# Patient Record
(demographics unavailable — no encounter records)

---

## 2025-03-24 NOTE — ASSESSMENT
[FreeTextEntry1] : Recommend urological consultation for assessment of complex recurrent UTI and pelvic collection.  At this time there is no documented evidence of communication to the GI tract.  Will obtain recent CT scans discs for further evaluation.  Appropriate referrals given.

## 2025-03-24 NOTE — PHYSICAL EXAM
[Abdomen Masses] : No abdominal masses [Abdomen Tenderness] : ~T No ~M abdominal tenderness [No HSM] : no hepatosplenomegaly [JVD] : no jugular venous distention  [Normal Breath Sounds] : Normal breath sounds [Normal Heart Sounds] : normal heart sounds [de-identified] : Suprapubic IR drain in place.  Kim catheter in place.

## 2025-03-24 NOTE — HISTORY OF PRESENT ILLNESS
[FreeTextEntry1] : 70 yo F presents for initial evaluation.    PMH: HTN, DM, kidney stones PSH: hysterectomy, "liver mesh" FH: mother- colorectal cancer Medications: ASA, atorvastatin, metformin, valsartan, multivitamin  Allergies: NKDA Social history: Last Colonoscopy:  OBHx: ,  x4  History of endometrial cancer in .  Underwent hysterectomy followed by radiation therapy.  Treated for bladder stones with cystoscopy and lithotripsy.  Since that time has developed multiple pelvic collections requiring drainage and recurrent UTIs.  Discharged from [facility] 1 week prior.  Currently with Kim catheter and interventional radiology drain in place.  On IV antibiotics.  Denies pain at this time.  Appetite stable.  Bowel function normal.

## 2025-04-17 NOTE — HISTORY OF PRESENT ILLNESS
[FreeTextEntry1] : Chief Complaint: UTI bladder infection. Date of first visit: 04/17/2025  JACQUIE ESTRELLA is a 71 year old female who presents for UTI bladder infection.  History of endometrial cancer 2024 and underwent hysterectomy followed by radiation  Foe the past year she has been undergoing trial voids, passes void trial.  Couple of week later she develops right lower abdominal pain and then catheter placed.  Also report she has developed bladder stones Underwent cystoscopy with laser lithotripsy of bladder calculi on 4/17/24  3 weeks ago had drain placed due to abscess  CT Hx:  January 20 Air bubbles and bladder stones up to 3 cm   CT Cystogram 3/2025 There is a fistulous communication between the anterior wall of the urinary bladder and the rectus musculature of the lower abdominal wall.  Extravasated contrast from the bladder accumulates into a poorly defined intramuscular fluid collection 9.7 x 4.6 cm   Medications: ASA, atorvastatin, metformin, valsartan, multivitamin PMH: HTN, DM, kidney stones PSH: hysterectomy, "liver mesh" FH: mother- colorectal cancer

## 2025-04-17 NOTE — ASSESSMENT
[FreeTextEntry1] : DX: suspect bladder erosion/fistula resulting in infected collections; in ddx is recurrent cancer, or bladder cancer cysto today with most of right bladder wall edematous, erythematous, inflammed  Plan Cytology  CX Cysto in OR with cystogram, better assess amount of bladder wall involved and if trigone involved (hard to see today given cloudy urine and patient intolerance/discomfort) and biopsy to r/o malignancy    Jesus Quigley MD, FACS, FRCS  of Urology [facility] Director of Laparoscopic and Robotic Surgery [facility] Director of Urology, [facility] Professor of Urology   (Office) [phone] (Cell)  [phone] [email or web address]     I performed history/review of imaging, discussed treatment plan with patient, agree with above transcription by PERRY.

## 2025-04-17 NOTE — ASSESSMENT
[FreeTextEntry1] : DX: suspect bladder erosion/fistula resulting in infected collections; in ddx is recurrent cancer, or bladder cancer cysto today with most of right bladder wall edematous, erythematous, inflammed  Plan Cytology  CX Cysto in OR with cystogram, better assess amount of bladder wall involved and if trigone involved (hard to see today given cloudy urine and patient intolerance/discomfort) and biopsy to r/o malignancy    Jesus Quigley MD, FACS, FRCS  of Urology [facility] Director of Laparoscopic and Robotic Surgery [facility] Director of Urology, [facility] Professor of Urology   (Office) [phone] (Cell)  [phone] [email or web address]     I performed history/review of imaging, discussed treatment plan with patient, agree with above transcription by PERRY.

## 2025-05-18 NOTE — HISTORY OF PRESENT ILLNESS
[FreeTextEntry1] : 71-year-old female referred by Dr. 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 She has a history of UTIs, incontinence and urinary retention with several marion catheters and TOVs. History of hysterectomy with sling>10 years ago at [facility]. She had a cystoscopy on 4/17/25 which showed right bladder wall edema and erythema. Urine cytology negative. Cystoscopy with cystogram in the OR recommended. Procedure done on 5/7/25. Found to have a vesicovaginal fistula and intravesical erosion of mesh. She is here today with marion catheter in place. Drain placed by IR for pelvic abscess.   CT (3/13/25) Fistulous tract from anterior/inferior wall of the urinary bladder to the lower anterior abdominal wall rectus musculature. Extravasated contrast from the bladder accumulates into a poorly defined intramuscular fluid collection measuring 9.7 x 4.6cm. Bladder diverticulum noted. Urinary bladder diffusely thick-walled.   Pathology (5/7/25) Final Diagnosis 1.  Posterior bladder wall: -   Ulcerated extensively denuded urothelium with acute and chronic inflammation and fibrosis  2.  Eroded mesh from bladder: -   Mesh, gross diagnosis

## 2025-05-18 NOTE — HISTORY OF PRESENT ILLNESS
[FreeTextEntry1] : 71-year-old female referred by Dr. 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 She has a history of UTIs, incontinence and urinary retention with several marion catheters and TOVs. History of hysterectomy with sling>10 years ago at [facility]. She had a cystoscopy on 4/17/25 which showed right bladder wall edema and erythema. Urine cytology negative. Cystoscopy with cystogram in the OR recommended. Procedure done on 5/7/25. Found to have a vesicovaginal fistula and intravesical erosion of mesh. She is here today with maroin catheter in place. Drain placed by IR for pelvic abscess.   CT (3/13/25) Fistulous tract from anterior/inferior wall of the urinary bladder to the lower anterior abdominal wall rectus musculature. Extravasated contrast from the bladder accumulates into a poorly defined intramuscular fluid collection measuring 9.7 x 4.6cm. Bladder diverticulum noted. Urinary bladder diffusely thick-walled.   Pathology (5/7/25) Final Diagnosis 1.  Posterior bladder wall: -   Ulcerated extensively denuded urothelium with acute and chronic inflammation and fibrosis  2.  Eroded mesh from bladder: -   Mesh, gross diagnosis

## 2025-05-18 NOTE — LETTER BODY
[Dear  ___] : Dear  [unfilled], [Consult Letter:] : I had the pleasure of evaluating your patient, [unfilled]. [Please see my note below.] : Please see my note below. [Consult Closing:] : Thank you very much for allowing me to participate in the care of this patient.  If you have any questions, please do not hesitate to contact me. [Sincerely,] : Sincerely, [FreeTextEntry3] : Heron Laguerre MD System Director Urogynecology/URPS Department of Urology [facility]   at [facility] for [facility]  of Urology [facility] at [facility]

## 2025-05-18 NOTE — LETTER BODY
[Dear  ___] : Dear  [unfilled], [Consult Letter:] : I had the pleasure of evaluating your patient, [unfilled]. [Please see my note below.] : Please see my note below. [Consult Closing:] : Thank you very much for allowing me to participate in the care of this patient.  If you have any questions, please do not hesitate to contact me. [Sincerely,] : Sincerely, [FreeTextEntry3] : Heron Laguerre MD System Director Urogynecology/URPS Department of Urology [facility]   at [facility] for [facility]rology  of Urology [facility] at [facility]

## 2025-05-18 NOTE — ASSESSMENT
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   -CT Urogram with cystogram -F/u for cystoscopy -Urine culture today. Will f/u with result.   I, Dr. Heron Laguerre, personally performed the evaluation and management (E/M) services for this established patient who presents today with (a) new problem(s)/exacerbation of (an) existing condition(s).  That E/M includes conducting the clinically appropriate interval history &/or exam, assessing all new/exacerbated conditions, and establishing a new plan of care.  Today, my PERRY Shawnee, was here to observe &/or participate in the visit & follow plan of care established by me.

## 2025-05-18 NOTE — LETTER BODY
[Dear  ___] : Dear  [unfilled], [Consult Letter:] : I had the pleasure of evaluating your patient, [unfilled]. [Please see my note below.] : Please see my note below. [Consult Closing:] : Thank you very much for allowing me to participate in the care of this patient.  If you have any questions, please do not hesitate to contact me. [Sincerely,] : Sincerely, [FreeTextEntry3] : Heron Laguerre MD System Director Urogynecology/URPS Department of Urology [facility]   at [facility]  of Urology [facility] at [facility]

## 2025-05-18 NOTE — HISTORY OF PRESENT ILLNESS
[FreeTextEntry1] : 71-year-old female referred by Dr. 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 She has a history of UTIs, incontinence and urinary retention with several marion catheters and TOVs. History of hysterectomy with sling>10 years ago at [facility]. She had a cystoscopy on 4/17/25 which showed right bladder wall edema and erythema. Urine cytology negative. Cystoscopy with cystogram in the OR recommended. Procedure done on 5/7/25. Found to have a vesicovaginal fistula and intravesical erosion of mesh. She is here today with marion catheter in place. Drain placed by IR for pelvic abscess.   CT (3/13/25) Fistulous tract from anterior/inferior wall of the urinary bladder to the lower anterior abdominal wall rectus musculature. Extravasated contrast from the bladder accumulates into a poorly defined intramuscular fluid collection measuring 9.7 x 4.6cm. Bladder diverticulum noted. Urinary bladder diffusely thick-walled.   Pathology (5/7/25) Final Diagnosis 1.  Posterior bladder wall: -   Ulcerated extensively denuded urothelium with acute and chronic inflammation and fibrosis  2.  Eroded mesh from bladder: -   Mesh, gross diagnosis

## 2025-06-11 NOTE — HISTORY OF PRESENT ILLNESS
[FreeTextEntry1] : 71-year-old female referred by Dr. 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 She has a history of UTIs, incontinence and urinary retention with several marion catheters and TOVs. History of hysterectomy with sling>10 years ago at [facility]. She had a cystoscopy on 4/17/25 which showed right bladder wall edema and erythema. Urine cytology negative. Cystoscopy with cystogram in the OR recommended. Procedure done on 5/7/25. Found to have a vesicovaginal fistula and intravesical erosion of mesh. She is here today with marion catheter in place. Drain placed by IR for pelvic abscess.  CT (3/13/25) Fistulous tract from anterior/inferior wall of the urinary bladder to the lower anterior abdominal wall rectus musculature. Extravasated contrast from the bladder accumulates into a poorly defined intramuscular fluid collection measuring 9.7 x 4.6cm. Bladder diverticulum noted. Urinary bladder diffusely thick-walled.  Pathology (5/7/25) Final Diagnosis 1. Posterior bladder wall: - Ulcerated extensively denuded urothelium with acute and chronic inflammation and fibrosis  2. 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  The patient did have a CT cystogram repeated which did not reveal a fistula.  She does still have an indwelling pelvic drain for previous collection.  On cystoscopy today, there is mesh erosion noted on the right side near the dome of the bladder approximately 2 to 3 cm worth inside the lumen of the bladder.  No identifiable fistula could be noted.  With the cystoscope in place, pelvic exam performed, no drainage from the vagina was noted.  Plan: We reviewed the findings from the CT cystogram as well as cystoscopy, there does not appear to be a fistula.  She does have significant mesh erosion.  I reviewed with the patient and son the need for a removal of this mesh.  Reviewed the options including the transabdominal route open versus laparoscopic/robotic.  We will coordinate with colorectal surgery Dr. Galvez to assist with lysis of adhesions with an attempt to approach this robotically.

## 2025-06-11 NOTE — HISTORY OF PRESENT ILLNESS
[FreeTextEntry1] : 71-year-old female referred by Dr. 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 She has a history of UTIs, incontinence and urinary retention with several marion catheters and TOVs. History of hysterectomy with sling>10 years ago at [facility]. She had a cystoscopy on 4/17/25 which showed right bladder wall edema and erythema. Urine cytology negative. Cystoscopy with cystogram in the OR recommended. Procedure done on 5/7/25. Found to have a vesicovaginal fistula and intravesical erosion of mesh. She is here today with marion catheter in place. Drain placed by IR for pelvic abscess.  CT (3/13/25) Fistulous tract from anterior/inferior wall of the urinary bladder to the lower anterior abdominal wall rectus musculature. Extravasated contrast from the bladder accumulates into a poorly defined intramuscular fluid collection measuring 9.7 x 4.6cm. Bladder diverticulum noted. Urinary bladder diffusely thick-walled.  Pathology (5/7/25) Final Diagnosis 1. Posterior bladder wall: - Ulcerated extensively denuded urothelium with acute and chronic inflammation and fibrosis  2. 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 The patient did have a CT cystogram repeated which did not reveal a fistula. She does still have an indwelling pelvic drain for previous collection. On cystoscopy today, there is mesh erosion noted on the right side near the dome of the bladder approximately 2 to 3 cm worth inside the lumen of the bladder. No identifiable fistula could be noted. With the cystoscope in place, pelvic exam performed, no drainage from the vagina was noted.  Plan: We reviewed the findings from the CT cystogram as well as cystoscopy, there does not appear to be a fistula. She does have significant mesh erosion. I reviewed with the patient and son the need for a removal of this mesh. Reviewed the options including the transabdominal route open versus laparoscopic/robotic. We will coordinate with colorectal surgery Dr. Galvez to assist with lysis of adhesions with an attempt to approach this robotically.  6/11/25  Patient is here today for follow-up.  She states that she is voiding in the bathroom on some days with minimal leakage, however on other days has more significant unaware incontinence.  She still has her drain in.  I spoke with the interventional radiologist Dr. Abebe, he will do a drain study prior to removing the drain.  Plan: Once the drain study is completed and potentially the drain is removed, I like to do a repeat exam for her to reassess for potential fistula.  We are still planning on moving forward with a removal of the extruded vaginal mesh plus or minus any other type of reconstructive and from a bladder standpoint that is necessary.

## 2025-06-11 NOTE — HISTORY OF PRESENT ILLNESS
[FreeTextEntry1] : 71-year-old female referred by Dr. 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 She has a history of UTIs, incontinence and urinary retention with several marion catheters and TOVs. History of hysterectomy with sling>10 years ago at [facility]. She had a cystoscopy on 4/17/25 which showed right bladder wall edema and erythema. Urine cytology negative. Cystoscopy with cystogram in the OR recommended. Procedure done on 5/7/25. Found to have a vesicovaginal fistula and intravesical erosion of mesh. She is here today with marion catheter in place. Drain placed by IR for pelvic abscess.  CT (3/13/25) Fistulous tract from anterior/inferior wall of the urinary bladder to the lower anterior abdominal wall rectus musculature. Extravasated contrast from the bladder accumulates into a poorly defined intramuscular fluid collection measuring 9.7 x 4.6cm. Bladder diverticulum noted. Urinary bladder diffusely thick-walled.  Pathology (5/7/25) Final Diagnosis 1. Posterior bladder wall: - Ulcerated extensively denuded urothelium with acute and chronic inflammation and fibrosis  2. 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  The patient did have a CT cystogram repeated which did not reveal a fistula.  She does still have an indwelling pelvic drain for previous collection.  On cystoscopy today, there is mesh erosion noted on the right side near the dome of the bladder approximately 2 to 3 cm worth inside the lumen of the bladder.  No identifiable fistula could be noted.  With the cystoscope in place, pelvic exam performed, no drainage from the vagina was noted.  Plan: We reviewed the findings from the CT cystogram as well as cystoscopy, there does not appear to be a fistula.  She does have significant mesh erosion.  I reviewed with the patient and son the need for a removal of this mesh.  Reviewed the options including the transabdominal route open versus laparoscopic/robotic.  We will coordinate with colorectal surgery Dr. Galvez to assist with lysis of adhesions with an attempt to approach this robotically.

## 2025-07-15 NOTE — HISTORY OF PRESENT ILLNESS
[FreeTextEntry1] : 71-year-old female referred by Dr. 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 She has a history of UTIs, incontinence and urinary retention with several marion catheters and TOVs. History of hysterectomy with sling>10 years ago at [facility]. She had a cystoscopy on 4/17/25 which showed right bladder wall edema and erythema. Urine cytology negative. Cystoscopy with cystogram in the OR recommended. Procedure done on 5/7/25. Found to have a vesicovaginal fistula and intravesical erosion of mesh. She is here today with marion catheter in place. Drain placed by IR for pelvic abscess.  CT (3/13/25) Fistulous tract from anterior/inferior wall of the urinary bladder to the lower anterior abdominal wall rectus musculature. Extravasated contrast from the bladder accumulates into a poorly defined intramuscular fluid collection measuring 9.7 x 4.6cm. Bladder diverticulum noted. Urinary bladder diffusely thick-walled.  Pathology (5/7/25) Final Diagnosis 1. Posterior bladder wall: - Ulcerated extensively denuded urothelium with acute and chronic inflammation and fibrosis  2. 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 The patient did have a CT cystogram repeated which did not reveal a fistula. She does still have an indwelling pelvic drain for previous collection. On cystoscopy today, there is mesh erosion noted on the right side near the dome of the bladder approximately 2 to 3 cm worth inside the lumen of the bladder. No identifiable fistula could be noted. With the cystoscope in place, pelvic exam performed, no drainage from the vagina was noted.  Plan: We reviewed the findings from the CT cystogram as well as cystoscopy, there does not appear to be a fistula. She does have significant mesh erosion. I reviewed with the patient and son the need for a removal of this mesh. Reviewed the options including the transabdominal route open versus laparoscopic/robotic. We will coordinate with colorectal surgery Dr. Galvez to assist with lysis of adhesions with an attempt to approach this robotically.  6/11/25  Patient is here today for follow-up. She states that she is voiding in the bathroom on some days with minimal leakage, however on other days has more significant unaware incontinence. She still has her drain in. I spoke with the interventional radiologist Dr. Abebe, he will do a drain study prior to removing the drain.  Plan: Once the drain study is completed and potentially the drain is removed, I like to do a repeat exam for her to reassess for potential fistula. We are still planning on moving forward with a removal of the extruded vaginal mesh plus or minus any other type of reconstructive and from a bladder standpoint that is necessary.  7/15/25  71-year-old female with history of vesicovaginal fistula, intravesical mesh. No fistula seen on CT cystogram. She has significant mesh erosion with plan for removal and possible reconstructive surgery. She is here today for follow-up.  She does endorse unaware incontinence.  She is going through a couple of pull-ups during the day.  She denies any other specific voiding symptoms.  Her drain has been removed and she is doing well from that standpoint.  On exam, all of her abdominal incisions are healed, drain sites are healed.  Pelvic exam deferred for today.  Plan: We discussed follow-up imaging to further elucidate the potential for a fistula given her recurrence of vaginal leakage symptoms that are suspicious for a fistula.  I have ordered a CT urogram to evaluate both her upper tracts as well as her bladder for either a vesicovaginal fistula or ureterovaginal fistula.  I discussed this with both the patient and her son.  Her son works at [facility], they will get the imaging at that location and she will ultimately follow-up for review of CT cystogram as well as a pelvic exam.  I explained a reconstruction would be in order to remove the bladder mesh erosion, the bladder and potential fistula repair.  We will have involvement of colorectal surgery Dr. Galvez to assist with the procedure.

## 2025-07-15 NOTE — LETTER BODY
[Dear  ___] : Dear  [unfilled], [Courtesy Letter:] : I had the pleasure of seeing your patient, [unfilled], in my office today. [Please see my note below.] : Please see my note below. [Consult Closing:] : Thank you very much for allowing me to participate in the care of this patient.  If you have any questions, please do not hesitate to contact me. [Sincerely,] : Sincerely, [FreeTextEntry3] : Heron Laguerre MD System Director Urogynecology/URPS Department of Urology [facility]   at [facility] for [facility]  of Urology [facility] [employer] at [facility]

## 2025-07-15 NOTE — LETTER BODY
[Dear  ___] : Dear  [unfilled], [Courtesy Letter:] : I had the pleasure of seeing your patient, [unfilled], in my office today. [Please see my note below.] : Please see my note below. [Consult Closing:] : Thank you very much for allowing me to participate in the care of this patient.  If you have any questions, please do not hesitate to contact me. [Sincerely,] : Sincerely, [FreeTextEntry3] : Heron Laguerre MD System Director Urogynecology/URPS Department of Urology [facility]   at [facility]  of Urology [facility] [employer] at [facility]

## 2025-07-15 NOTE — HISTORY OF PRESENT ILLNESS
[FreeTextEntry1] : 71-year-old female referred by Dr. 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 She has a history of UTIs, incontinence and urinary retention with several marion catheters and TOVs. History of hysterectomy with sling>10 years ago at [facility]. She had a cystoscopy on 4/17/25 which showed right bladder wall edema and erythema. Urine cytology negative. Cystoscopy with cystogram in the OR recommended. Procedure done on 5/7/25. Found to have a vesicovaginal fistula and intravesical erosion of mesh. She is here today with marion catheter in place. Drain placed by IR for pelvic abscess.  CT (3/13/25) Fistulous tract from anterior/inferior wall of the urinary bladder to the lower anterior abdominal wall rectus musculature. Extravasated contrast from the bladder accumulates into a poorly defined intramuscular fluid collection measuring 9.7 x 4.6cm. Bladder diverticulum noted. Urinary bladder diffusely thick-walled.  Pathology (5/7/25) Final Diagnosis 1. Posterior bladder wall: - Ulcerated extensively denuded urothelium with acute and chronic inflammation and fibrosis  2. 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 The patient did have a CT cystogram repeated which did not reveal a fistula. She does still have an indwelling pelvic drain for previous collection. On cystoscopy today, there is mesh erosion noted on the right side near the dome of the bladder approximately 2 to 3 cm worth inside the lumen of the bladder. No identifiable fistula could be noted. With the cystoscope in place, pelvic exam performed, no drainage from the vagina was noted.  Plan: We reviewed the findings from the CT cystogram as well as cystoscopy, there does not appear to be a fistula. She does have significant mesh erosion. I reviewed with the patient and son the need for a removal of this mesh. Reviewed the options including the transabdominal route open versus laparoscopic/robotic. We will coordinate with colorectal surgery Dr. Galvez to assist with lysis of adhesions with an attempt to approach this robotically.  6/11/25  Patient is here today for follow-up. She states that she is voiding in the bathroom on some days with minimal leakage, however on other days has more significant unaware incontinence. She still has her drain in. I spoke with the interventional radiologist Dr. Abebe, he will do a drain study prior to removing the drain.  Plan: Once the drain study is completed and potentially the drain is removed, I like to do a repeat exam for her to reassess for potential fistula. We are still planning on moving forward with a removal of the extruded vaginal mesh plus or minus any other type of reconstructive and from a bladder standpoint that is necessary.  7/15/25  71-year-old female with history of vesicovaginal fistula, intravesical mesh. No fistula seen on CT cystogram. She has significant mesh erosion with plan for removal and possible reconstructive surgery. She is here today for follow-up.  She does endorse unaware incontinence.  She is going through a couple of pull-ups during the day.  She denies any other specific voiding symptoms.  Her drain has been removed and she is doing well from that standpoint.  On exam, all of her abdominal incisions are healed, drain sites are healed.  Pelvic exam deferred for today.  Plan: We discussed follow-up imaging to further elucidate the potential for a fistula given her recurrence of vaginal leakage symptoms that are suspicious for a fistula.  I have ordered a CT urogram to evaluate both her upper tracts as well as her bladder for either a vesicovaginal fistula or ureterovaginal fistula.  I discussed this with both the patient and her son.  Her son works at [facility], they will get the imaging at that location and she will ultimately follow-up for review of CT cystogram as well as a pelvic exam.  I explained a reconstruction would be in order to remove the bladder mesh erosion, the bladder and potential fistula repair.  We will have involvement of colorectal surgery Dr. Galvez to assist with the procedure.

## 2025-07-29 NOTE — HISTORY OF PRESENT ILLNESS
[FreeTextEntry1] : 71-year-old female referred by Dr. 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 She has a history of UTIs, incontinence and urinary retention with several marion catheters and TOVs. History of hysterectomy with sling>10 years ago at [facility]. She had a cystoscopy on 4/17/25 which showed right bladder wall edema and erythema. Urine cytology negative. Cystoscopy with cystogram in the OR recommended. Procedure done on 5/7/25. Found to have a vesicovaginal fistula and intravesical erosion of mesh. She is here today with marion catheter in place. Drain placed by IR for pelvic abscess.  CT (3/13/25) Fistulous tract from anterior/inferior wall of the urinary bladder to the lower anterior abdominal wall rectus musculature. Extravasated contrast from the bladder accumulates into a poorly defined intramuscular fluid collection measuring 9.7 x 4.6cm. Bladder diverticulum noted. Urinary bladder diffusely thick-walled.  Pathology (5/7/25) Final Diagnosis 1. Posterior bladder wall: - Ulcerated extensively denuded urothelium with acute and chronic inflammation and fibrosis  2. 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 The patient did have a CT cystogram repeated which did not reveal a fistula. She does still have an indwelling pelvic drain for previous collection. On cystoscopy today, there is mesh erosion noted on the right side near the dome of the bladder approximately 2 to 3 cm worth inside the lumen of the bladder. No identifiable fistula could be noted. With the cystoscope in place, pelvic exam performed, no drainage from the vagina was noted.  Plan: We reviewed the findings from the CT cystogram as well as cystoscopy, there does not appear to be a fistula. She does have significant mesh erosion. I reviewed with the patient and son the need for a removal of this mesh. Reviewed the options including the transabdominal route open versus laparoscopic/robotic. We will coordinate with colorectal surgery Dr. Galvez to assist with lysis of adhesions with an attempt to approach this robotically.  6/11/25  Patient is here today for follow-up. She states that she is voiding in the bathroom on some days with minimal leakage, however on other days has more significant unaware incontinence. She still has her drain in. I spoke with the interventional radiologist Dr. Abebe, he will do a drain study prior to removing the drain.  Plan: Once the drain study is completed and potentially the drain is removed, I like to do a repeat exam for her to reassess for potential fistula. We are still planning on moving forward with a removal of the extruded vaginal mesh plus or minus any other type of reconstructive and from a bladder standpoint that is necessary.  7/15/25  71-year-old female with history of vesicovaginal fistula, intravesical mesh. No fistula seen on CT cystogram. She has significant mesh erosion with plan for removal and possible reconstructive surgery. She is here today for follow-up. She does endorse unaware incontinence. She is going through a couple of pull-ups during the day. She denies any other specific voiding symptoms. Her drain has been removed and she is doing well from that standpoint.  On exam, all of her abdominal incisions are healed, drain sites are healed. Pelvic exam deferred for today.  Plan: We discussed follow-up imaging to further elucidate the potential for a fistula given her recurrence of vaginal leakage symptoms that are suspicious for a fistula. I have ordered a CT urogram to evaluate both her upper tracts as well as her bladder for either a vesicovaginal fistula or ureterovaginal fistula. I discussed this with both the patient and her son. Her son works at [facility], they will get the imaging at that location and she will ultimately follow-up for review of CT cystogram as well as a pelvic exam. I explained a reconstruction would be in order to remove the bladder mesh erosion, the bladder and potential fistula repair. We will have involvement of colorectal surgery Dr. Galvez to assist with the procedure.  7/29/25  72-year-old female with history of vesicovaginal fistula, intravesical mesh. Here today to review CT Cystogram.   CT 7/24/2025 Impression: Marked bladder wall thickening, surrounding stranding, and luminal debris consistent with cystitis.  Vesicovaginal fistula. Increased enhancement of the left ureter with decreased filling/opacification of the lumen.  Plan: Imaging reviewed with the patient, she is having some suprapubic discomfort.  She continues to have incontinence secondary to her fistula.  We had a discussion with regards to repair of her fistula and removal of the eroded mesh.  Given her previous abdominal complications, I will be doing the case combined with Dr. Glover from colorectal surgery.  I have her referred her back to see him preoperatively.  I will also be working with my partner Dr. Pieter Murry in terms of the lower urinary tract reconstruction.  RBA PCs of this he discussed at length and she and her relative with her today are in agreement with plan.